# Patient Record
Sex: FEMALE
[De-identification: names, ages, dates, MRNs, and addresses within clinical notes are randomized per-mention and may not be internally consistent; named-entity substitution may affect disease eponyms.]

---

## 2019-02-28 PROBLEM — Z00.00 ENCOUNTER FOR PREVENTIVE HEALTH EXAMINATION: Status: ACTIVE | Noted: 2019-02-28

## 2019-03-01 ENCOUNTER — APPOINTMENT (OUTPATIENT)
Dept: CARDIOLOGY | Facility: CLINIC | Age: 34
End: 2019-03-01
Payer: COMMERCIAL

## 2019-03-01 VITALS
WEIGHT: 193.38 LBS | SYSTOLIC BLOOD PRESSURE: 94 MMHG | OXYGEN SATURATION: 98 % | HEART RATE: 80 BPM | DIASTOLIC BLOOD PRESSURE: 68 MMHG | HEIGHT: 60 IN | BODY MASS INDEX: 37.97 KG/M2

## 2019-03-01 DIAGNOSIS — R07.89 OTHER CHEST PAIN: ICD-10-CM

## 2019-03-01 DIAGNOSIS — Z82.49 FAMILY HISTORY OF ISCHEMIC HEART DISEASE AND OTHER DISEASES OF THE CIRCULATORY SYSTEM: ICD-10-CM

## 2019-03-01 DIAGNOSIS — R94.31 ABNORMAL ELECTROCARDIOGRAM [ECG] [EKG]: ICD-10-CM

## 2019-03-01 DIAGNOSIS — Z83.3 FAMILY HISTORY OF DIABETES MELLITUS: ICD-10-CM

## 2019-03-01 PROCEDURE — 99204 OFFICE O/P NEW MOD 45 MIN: CPT

## 2019-03-01 PROCEDURE — 93306 TTE W/DOPPLER COMPLETE: CPT

## 2019-03-01 NOTE — REVIEW OF SYSTEMS
[Chest Pain] : chest pain [Dizziness] : dizziness [Numbness (Hypesthesia)] : numbness [Negative] : Endocrine [Shortness Of Breath] : no shortness of breath [Lower Ext Edema] : no extremity edema [Palpitations] : no palpitations [Tremor] : no tremor was seen [Convulsions] : no convulsions [Tingling (Paresthesia)] : no tingling [Easy Bleeding] : no tendency for easy bleeding [Easy Bruising] : no tendency for easy bruising

## 2019-03-01 NOTE — DISCUSSION/SUMMARY
[FreeTextEntry1] : In a summary Everardo Chavez is a young female with chest pains though atypical they are recurrent and abnormal EKG, Echo done showed normal LV systolic function and wall motion. Will get stress test to evaluate chest pains. BP on lower side. Drink plenty of water. Further plan based on stress test results.

## 2019-03-01 NOTE — PHYSICAL EXAM
[General Appearance - Well Developed] : well developed [Normal Appearance] : normal appearance [Well Groomed] : well groomed [General Appearance - Well Nourished] : well nourished [No Deformities] : no deformities [General Appearance - In No Acute Distress] : no acute distress [Normal Conjunctiva] : the conjunctiva exhibited no abnormalities [Eyelids - No Xanthelasma] : the eyelids demonstrated no xanthelasmas [Normal Oral Mucosa] : normal oral mucosa [No Oral Pallor] : no oral pallor [No Oral Cyanosis] : no oral cyanosis [Heart Rate And Rhythm] : heart rate and rhythm were normal [Heart Sounds] : normal S1 and S2 [Murmurs] : no murmurs present [Arterial Pulses Normal] : the arterial pulses were normal [Edema] : no peripheral edema present [Respiration, Rhythm And Depth] : normal respiratory rhythm and effort [Auscultation Breath Sounds / Voice Sounds] : lungs were clear to auscultation bilaterally [Bowel Sounds] : normal bowel sounds [Abdomen Soft] : soft [Abdomen Tenderness] : non-tender [Abnormal Walk] : normal gait [Nail Clubbing] : no clubbing of the fingernails [Cyanosis, Localized] : no localized cyanosis [Skin Color & Pigmentation] : normal skin color and pigmentation [Skin Turgor] : normal skin turgor [] : no rash [Oriented To Time, Place, And Person] : oriented to person, place, and time [Impaired Insight] : insight and judgment were intact [No Anxiety] : not feeling anxious [FreeTextEntry1] : No JVD

## 2019-03-01 NOTE — HISTORY OF PRESENT ILLNESS
[FreeTextEntry1] : Everardo Chavez is a 33 year old female with no significant medical history comes for cardiac evaluation. Complaining of random onset of left sided chest tightness, 3/10 in intensity along with left arm numbness lasts for few minutes and relieved by itself of couple of months duration. No exertional chest pains. Denies any shortness of breath on exertion or palpitations. ? Anxiety. Physically active.

## 2020-09-01 ENCOUNTER — RESULT REVIEW (OUTPATIENT)
Age: 35
End: 2020-09-01

## 2020-09-01 ENCOUNTER — INPATIENT (INPATIENT)
Facility: HOSPITAL | Age: 35
LOS: 0 days | Discharge: HOME | End: 2020-09-02
Attending: OBSTETRICS & GYNECOLOGY | Admitting: OBSTETRICS & GYNECOLOGY
Payer: COMMERCIAL

## 2020-09-01 VITALS — HEIGHT: 62 IN | WEIGHT: 209.44 LBS

## 2020-09-01 LAB
BASOPHILS # BLD AUTO: 0 K/UL — SIGNIFICANT CHANGE UP (ref 0–0.2)
BASOPHILS NFR BLD AUTO: 0 % — SIGNIFICANT CHANGE UP (ref 0–2)
BLD GP AB SCN SERPL QL: NEGATIVE — SIGNIFICANT CHANGE UP
EOSINOPHIL # BLD AUTO: 0 K/UL — SIGNIFICANT CHANGE UP (ref 0–0.5)
EOSINOPHIL NFR BLD AUTO: 0 % — SIGNIFICANT CHANGE UP (ref 0–6)
HCT VFR BLD CALC: 34.1 % — LOW (ref 34.5–45)
HGB BLD-MCNC: 10.8 G/DL — LOW (ref 11.5–15.5)
LYMPHOCYTES # BLD AUTO: 17.5 % — SIGNIFICANT CHANGE UP (ref 13–44)
LYMPHOCYTES # BLD AUTO: 3.48 K/UL — HIGH (ref 1–3.3)
MCHC RBC-ENTMCNC: 29 PG — SIGNIFICANT CHANGE UP (ref 27–34)
MCHC RBC-ENTMCNC: 31.7 GM/DL — LOW (ref 32–36)
MCV RBC AUTO: 91.4 FL — SIGNIFICANT CHANGE UP (ref 80–100)
MONOCYTES # BLD AUTO: 1.75 K/UL — HIGH (ref 0–0.9)
MONOCYTES NFR BLD AUTO: 8.8 % — SIGNIFICANT CHANGE UP (ref 2–14)
NEUTROPHILS # BLD AUTO: 14.65 K/UL — HIGH (ref 1.8–7.4)
NEUTROPHILS NFR BLD AUTO: 73.7 % — SIGNIFICANT CHANGE UP (ref 43–77)
PLATELET # BLD AUTO: 241 K/UL — SIGNIFICANT CHANGE UP (ref 150–400)
RBC # BLD: 3.73 M/UL — LOW (ref 3.8–5.2)
RBC # FLD: 12.6 % — SIGNIFICANT CHANGE UP (ref 10.3–14.5)
RH IG SCN BLD-IMP: POSITIVE — SIGNIFICANT CHANGE UP
SARS-COV-2 IGG SERPL QL IA: NEGATIVE — SIGNIFICANT CHANGE UP
SARS-COV-2 IGM SERPL IA-ACNC: <3.8 AU/ML — SIGNIFICANT CHANGE UP
SARS-COV-2 RNA SPEC QL NAA+PROBE: SIGNIFICANT CHANGE UP
T PALLIDUM AB TITR SER: NEGATIVE — SIGNIFICANT CHANGE UP
WBC # BLD: 19.88 K/UL — HIGH (ref 3.8–10.5)
WBC # FLD AUTO: 19.88 K/UL — HIGH (ref 3.8–10.5)

## 2020-09-01 PROCEDURE — 88307 TISSUE EXAM BY PATHOLOGIST: CPT | Mod: 26

## 2020-09-01 RX ORDER — DIPHENHYDRAMINE HCL 50 MG
25 CAPSULE ORAL EVERY 6 HOURS
Refills: 0 | Status: DISCONTINUED | OUTPATIENT
Start: 2020-09-01 | End: 2020-09-02

## 2020-09-01 RX ORDER — ACETAMINOPHEN 500 MG
975 TABLET ORAL
Refills: 0 | Status: DISCONTINUED | OUTPATIENT
Start: 2020-09-01 | End: 2020-09-02

## 2020-09-01 RX ORDER — AER TRAVELER 0.5 G/1
1 SOLUTION RECTAL; TOPICAL EVERY 4 HOURS
Refills: 0 | Status: DISCONTINUED | OUTPATIENT
Start: 2020-09-01 | End: 2020-09-02

## 2020-09-01 RX ORDER — SODIUM CHLORIDE 9 MG/ML
3 INJECTION INTRAMUSCULAR; INTRAVENOUS; SUBCUTANEOUS EVERY 8 HOURS
Refills: 0 | Status: DISCONTINUED | OUTPATIENT
Start: 2020-09-01 | End: 2020-09-02

## 2020-09-01 RX ORDER — OXYTOCIN 10 UNIT/ML
333.33 VIAL (ML) INJECTION
Qty: 20 | Refills: 0 | Status: DISCONTINUED | OUTPATIENT
Start: 2020-09-01 | End: 2020-09-02

## 2020-09-01 RX ORDER — LANOLIN
1 OINTMENT (GRAM) TOPICAL EVERY 6 HOURS
Refills: 0 | Status: DISCONTINUED | OUTPATIENT
Start: 2020-09-01 | End: 2020-09-02

## 2020-09-01 RX ORDER — MAGNESIUM HYDROXIDE 400 MG/1
30 TABLET, CHEWABLE ORAL
Refills: 0 | Status: DISCONTINUED | OUTPATIENT
Start: 2020-09-01 | End: 2020-09-02

## 2020-09-01 RX ORDER — OXYCODONE HYDROCHLORIDE 5 MG/1
5 TABLET ORAL ONCE
Refills: 0 | Status: DISCONTINUED | OUTPATIENT
Start: 2020-09-01 | End: 2020-09-02

## 2020-09-01 RX ORDER — FENTANYL/BUPIVACAINE/NS/PF 2MCG/ML-.1
250 PLASTIC BAG, INJECTION (ML) INJECTION
Refills: 0 | Status: DISCONTINUED | OUTPATIENT
Start: 2020-09-01 | End: 2020-09-02

## 2020-09-01 RX ORDER — CITRIC ACID/SODIUM CITRATE 300-500 MG
15 SOLUTION, ORAL ORAL EVERY 6 HOURS
Refills: 0 | Status: DISCONTINUED | OUTPATIENT
Start: 2020-09-01 | End: 2020-09-01

## 2020-09-01 RX ORDER — KETOROLAC TROMETHAMINE 30 MG/ML
30 SYRINGE (ML) INJECTION ONCE
Refills: 0 | Status: DISCONTINUED | OUTPATIENT
Start: 2020-09-01 | End: 2020-09-01

## 2020-09-01 RX ORDER — IBUPROFEN 200 MG
600 TABLET ORAL EVERY 6 HOURS
Refills: 0 | Status: DISCONTINUED | OUTPATIENT
Start: 2020-09-01 | End: 2020-09-02

## 2020-09-01 RX ORDER — TETANUS TOXOID, REDUCED DIPHTHERIA TOXOID AND ACELLULAR PERTUSSIS VACCINE, ADSORBED 5; 2.5; 8; 8; 2.5 [IU]/.5ML; [IU]/.5ML; UG/.5ML; UG/.5ML; UG/.5ML
0.5 SUSPENSION INTRAMUSCULAR ONCE
Refills: 0 | Status: COMPLETED | OUTPATIENT
Start: 2020-09-01

## 2020-09-01 RX ORDER — BENZOCAINE 10 %
1 GEL (GRAM) MUCOUS MEMBRANE EVERY 6 HOURS
Refills: 0 | Status: DISCONTINUED | OUTPATIENT
Start: 2020-09-01 | End: 2020-09-02

## 2020-09-01 RX ORDER — PRAMOXINE HYDROCHLORIDE 150 MG/15G
1 AEROSOL, FOAM RECTAL EVERY 4 HOURS
Refills: 0 | Status: DISCONTINUED | OUTPATIENT
Start: 2020-09-01 | End: 2020-09-02

## 2020-09-01 RX ORDER — HYDROCORTISONE 1 %
1 OINTMENT (GRAM) TOPICAL EVERY 6 HOURS
Refills: 0 | Status: DISCONTINUED | OUTPATIENT
Start: 2020-09-01 | End: 2020-09-02

## 2020-09-01 RX ORDER — DIBUCAINE 1 %
1 OINTMENT (GRAM) RECTAL EVERY 6 HOURS
Refills: 0 | Status: DISCONTINUED | OUTPATIENT
Start: 2020-09-01 | End: 2020-09-02

## 2020-09-01 RX ORDER — SIMETHICONE 80 MG/1
80 TABLET, CHEWABLE ORAL EVERY 4 HOURS
Refills: 0 | Status: DISCONTINUED | OUTPATIENT
Start: 2020-09-01 | End: 2020-09-02

## 2020-09-01 RX ORDER — IBUPROFEN 200 MG
600 TABLET ORAL EVERY 6 HOURS
Refills: 0 | Status: COMPLETED | OUTPATIENT
Start: 2020-09-01 | End: 2021-07-31

## 2020-09-01 RX ORDER — OXYCODONE HYDROCHLORIDE 5 MG/1
5 TABLET ORAL
Refills: 0 | Status: DISCONTINUED | OUTPATIENT
Start: 2020-09-01 | End: 2020-09-02

## 2020-09-01 RX ORDER — SODIUM CHLORIDE 9 MG/ML
1000 INJECTION, SOLUTION INTRAVENOUS
Refills: 0 | Status: DISCONTINUED | OUTPATIENT
Start: 2020-09-01 | End: 2020-09-01

## 2020-09-01 RX ADMIN — SODIUM CHLORIDE 125 MILLILITER(S): 9 INJECTION, SOLUTION INTRAVENOUS at 02:05

## 2020-09-01 RX ADMIN — Medication 30 MILLIGRAM(S): at 06:05

## 2020-09-01 RX ADMIN — Medication 1 TABLET(S): at 13:05

## 2020-09-01 RX ADMIN — Medication 1000 MILLIUNIT(S)/MIN: at 04:48

## 2020-09-01 RX ADMIN — Medication 1000 MILLIUNIT(S)/MIN: at 06:52

## 2020-09-01 RX ADMIN — Medication 600 MILLIGRAM(S): at 18:20

## 2020-09-01 RX ADMIN — Medication 975 MILLIGRAM(S): at 11:30

## 2020-09-01 RX ADMIN — Medication 975 MILLIGRAM(S): at 10:57

## 2020-09-01 RX ADMIN — Medication 975 MILLIGRAM(S): at 21:23

## 2020-09-01 RX ADMIN — Medication 600 MILLIGRAM(S): at 19:00

## 2020-09-01 RX ADMIN — Medication 975 MILLIGRAM(S): at 22:21

## 2020-09-01 RX ADMIN — SODIUM CHLORIDE 125 MILLILITER(S): 9 INJECTION, SOLUTION INTRAVENOUS at 02:59

## 2020-09-02 ENCOUNTER — TRANSCRIPTION ENCOUNTER (OUTPATIENT)
Age: 35
End: 2020-09-02

## 2020-09-02 VITALS
HEART RATE: 68 BPM | DIASTOLIC BLOOD PRESSURE: 80 MMHG | SYSTOLIC BLOOD PRESSURE: 121 MMHG | TEMPERATURE: 98 F | RESPIRATION RATE: 17 BRPM | OXYGEN SATURATION: 98 %

## 2020-09-02 PROCEDURE — 88307 TISSUE EXAM BY PATHOLOGIST: CPT

## 2020-09-02 PROCEDURE — 85025 COMPLETE CBC W/AUTO DIFF WBC: CPT

## 2020-09-02 PROCEDURE — 86901 BLOOD TYPING SEROLOGIC RH(D): CPT

## 2020-09-02 PROCEDURE — 36415 COLL VENOUS BLD VENIPUNCTURE: CPT

## 2020-09-02 PROCEDURE — 86850 RBC ANTIBODY SCREEN: CPT

## 2020-09-02 PROCEDURE — 90715 TDAP VACCINE 7 YRS/> IM: CPT

## 2020-09-02 PROCEDURE — 86780 TREPONEMA PALLIDUM: CPT

## 2020-09-02 PROCEDURE — 87635 SARS-COV-2 COVID-19 AMP PRB: CPT

## 2020-09-02 PROCEDURE — 86769 SARS-COV-2 COVID-19 ANTIBODY: CPT

## 2020-09-02 RX ORDER — TETANUS TOXOID, REDUCED DIPHTHERIA TOXOID AND ACELLULAR PERTUSSIS VACCINE, ADSORBED 5; 2.5; 8; 8; 2.5 [IU]/.5ML; [IU]/.5ML; UG/.5ML; UG/.5ML; UG/.5ML
0.5 SUSPENSION INTRAMUSCULAR ONCE
Refills: 0 | Status: COMPLETED | OUTPATIENT
Start: 2020-09-02 | End: 2020-09-02

## 2020-09-02 RX ORDER — IBUPROFEN 200 MG
1 TABLET ORAL
Qty: 0 | Refills: 0 | DISCHARGE
Start: 2020-09-02

## 2020-09-02 RX ORDER — ACETAMINOPHEN 500 MG
3 TABLET ORAL
Qty: 0 | Refills: 0 | DISCHARGE
Start: 2020-09-02

## 2020-09-02 RX ADMIN — Medication 600 MILLIGRAM(S): at 05:55

## 2020-09-02 RX ADMIN — Medication 600 MILLIGRAM(S): at 14:00

## 2020-09-02 RX ADMIN — SODIUM CHLORIDE 3 MILLILITER(S): 9 INJECTION INTRAMUSCULAR; INTRAVENOUS; SUBCUTANEOUS at 00:17

## 2020-09-02 RX ADMIN — Medication 975 MILLIGRAM(S): at 16:07

## 2020-09-02 RX ADMIN — Medication 600 MILLIGRAM(S): at 00:18

## 2020-09-02 RX ADMIN — SODIUM CHLORIDE 3 MILLILITER(S): 9 INJECTION INTRAMUSCULAR; INTRAVENOUS; SUBCUTANEOUS at 06:19

## 2020-09-02 RX ADMIN — Medication 600 MILLIGRAM(S): at 06:55

## 2020-09-02 RX ADMIN — Medication 600 MILLIGRAM(S): at 13:26

## 2020-09-02 RX ADMIN — Medication 1 TABLET(S): at 13:26

## 2020-09-02 RX ADMIN — Medication 975 MILLIGRAM(S): at 09:50

## 2020-09-02 RX ADMIN — Medication 600 MILLIGRAM(S): at 01:10

## 2020-09-02 RX ADMIN — Medication 975 MILLIGRAM(S): at 09:17

## 2020-09-02 RX ADMIN — TETANUS TOXOID, REDUCED DIPHTHERIA TOXOID AND ACELLULAR PERTUSSIS VACCINE, ADSORBED 0.5 MILLILITER(S): 5; 2.5; 8; 8; 2.5 SUSPENSION INTRAMUSCULAR at 05:55

## 2020-09-02 NOTE — DISCHARGE NOTE OB - SEVERE ABDOMINAL/VAGINAL AND/OR RECTAL PAIN
Patient is called recommendations are given diet is gone over as well as notifying patient they are sent in the mail he will make appt to see NP and continue to complete the mediation therapy   Statement Selected

## 2020-09-02 NOTE — DISCHARGE NOTE OB - CARE PROVIDER_API CALL
Maday Lopes  OBSTETRICS AND GYNECOLOGY  [address] [place] [address]  Phone: [phone]  Fax: [phone]  Follow Up Time:

## 2020-09-02 NOTE — PROGRESS NOTE ADULT - SUBJECTIVE AND OBJECTIVE BOX
Patient is a 34y y.o. F now PPD #1 s/p .    NAEO. Patient was evaluated at bedside this AM. Pain is well-controlled with PO pain medications. She has been ambulating without difficulty and voiding spontaneously. She endorses decreasing vaginal bleeding.    Vital Signs Last 24 Hrs  T(C): 36.6 (02 Sep 2020 02:00), Max: 36.8 (01 Sep 2020 18:05)  T(F): 97.8 (02 Sep 2020 02:00), Max: 98.2 (01 Sep 2020 18:05)  HR: 68 (02 Sep 2020 02:00) (61 - 83)  BP: 121/80 (02 Sep 2020 02:00) (106/74 - 122/78)  BP(mean): --  RR: 17 (02 Sep 2020 02:00) (16 - 17)  SpO2: 98% (02 Sep 2020 02:00) (98% - 100%)    Physical Exam  Gen: Well-appearing. No acute distress. Resting comfortably in bed.  Resp: Breathing comfortably on RA.  Abd: Soft, non-tender, non-distended. Uterus firm at umbilicus.  Extremities: No calf tenderness.    Labs                          10.8   19.88 )-----------( 241      ( 01 Sep 2020 01:45 )             34.1                   20 @ 07:  -  20 @ 07:00  --------------------------------------------------------  IN: 3600 mL / OUT: 525 mL / NET: 3075 mL    20 @ 07:  -  20 @ 06:00  --------------------------------------------------------  IN: 0 mL / OUT: 900 mL / NET: -900 mL

## 2020-09-02 NOTE — DISCHARGE NOTE OB - CARE PLAN
Principal Discharge DX:	Vaginal delivery  Goal:	feel well  Assessment and plan of treatment:	Vaginal delivery, meeting all postpartum milestones.  Follow up in 6 weeks.

## 2020-09-02 NOTE — DISCHARGE NOTE OB - MATERIALS PROVIDED
Discharge Medication Information for Patients and Families Pocket Guide/Breastfeeding Mother’s Support Group Information/Tdap Vaccination (VIS Pub Date: 2012)/  Immunization Record/Breastfeeding Log/Back To Sleep Handout/Birth Certificate Instructions/[facility] Hearing Screen Program/Shaken Baby Prevention Handout/Vaccinations

## 2020-09-02 NOTE — DISCHARGE NOTE OB - PATIENT PORTAL LINK FT
You can access the FollowMyHealth Patient Portal offered by [facility] by registering at the following website: [email or web address]. By joining [facility]’s FollowMyHealth portal, you will also be able to view your health information using other applications (apps) compatible with our system.

## 2020-09-02 NOTE — LACTATION INITIAL EVALUATION - NS LACT CON REASON FOR REQ
multiparous mom multiparous mom/Pt. is a P3 at 38.4 wks. gestation via vaginal delivery.  Pt. denies medical problems during the pregnancy.  Pt. states she did have difficulty with breastfeeding her two older children.  Pt.rports milk supply was slow coming in  first child difficult time latching.   and supplemented for approx. 6 months.  Second child also difficulty latching and low milk supply although baby did not have significant weight loss.  Pt. had baby to breast  and a shallow latch was observed.  Baby removed from breast to reposition and attempt to relatch.  Pt.stated pediatrician mentioned a tongue tie and upon oral exam a suspected posterior tongue tie was observed.  Discussed with parents implications of a tongue tie with regards to breastfeeding.  Pt. encouraged to follow up with pediatrician and ask for referral for a pediatric ENT or dentist to evaluate frenulum.  Baby was positioned to breast.  Reviewed with pt. alignment of baby's nose topt.' nipple and to encourage baby to open mouth wide to achieve a deep latch.  [place] was able to achieve a deep latch and maintain consistent sucking for approx.  7-8 minutes.  Discussed with pt. due to hx of low milk supply pt. can pump after breastfeeding to provide increased stimulation.  pt.states nurse instructed pt. on use of pump and pt. states she does have pump at home.  At this time discussed with pt. although tongue tie was observed baby is able to maintain consistent latch and is satisfied after feeding, voiding and stooling appropiately  tongue tie is not interfering with breastfeeding.  Pt. has easily expressed colostrum and shown to pt. multiparous mom/Pt. is a P3 at 38.4 wks. gestation via vaginal delivery.  Pt. denies medical problems during the pregnancy.  Pt. states she did have difficulty with breastfeeding her two older children.  Pt. reports milk supply was slow coming in with first child had difficult time latching.   and supplemented for approx. 6 months.  Second child also had difficulty latching and low milk supply although baby did not have significant weight loss.  Pt. had baby to breast  and a shallow latch was observed.  Baby removed from breast to reposition and attempt to relatch.  Pt. stated pediatrician mentioned a tongue tie and upon oral exam a suspected posterior tongue tie was observed.  Discussed with parents implications of a tongue tie with regards to breastfeeding.  Pt. encouraged to follow up with pediatrician and ask for referral for a pediatric ENT or dentist to evaluate frenulum.  Baby was positioned to breast.  Reviewed with pt. alignment of baby's nose to pt.' nipple and to encourage baby to open mouth wide to achieve a deep latch.  Baby was able to achieve a deep latch and maintain consistent sucking for approx.  7-8 minutes.  Discussed with pt. due to hx of low milk supply pt. can pump after breastfeeding to provide increased stimulation.  Pt. states nurse instructed pt. on use of pump and pt. states she does have pump at home.  At this time discussed with pt. although tongue tie was observed baby is able to maintain consistent latch and is satisfied after feeding, voiding and stooling appropiately  tongue tie does not appear to be interfering with breastfeeding.  Pt. understands to continue monitoring breastfeeding.  Pt. has easily expressed colostrum and shown to pt., reviewed hand expression with pt.

## 2020-09-02 NOTE — LACTATION INITIAL EVALUATION - LACTATION INTERVENTIONS
initiate dual electric pump routine/initiate skin to skin/due to previous hx with low supply and slow transition pt. encouraged to pump after breastfeeding and collect any colsotrum and feed to baby after breastfeeding during next feeding session.

## 2020-09-02 NOTE — PROGRESS NOTE ADULT - ASSESSMENT
Assessment/Plan    34y y.o. now PPD#1  s/p . AfVSS. Patient is progressing toward all postpartum milestones. Pain is well-controlled on PO medications. Anticipate discharge today or tomorrow.    1. Pain  - Well-controlled on Motrin and Tylenol ATC    2. GI  - Tolerating regular diet without n/v  - Senna PRN    3.   - Voiding spontaneously without difficulty/pain    4. DVT prophylaxis  - Encouraging ambulation    5. Dispo  - Anticipate dispo PPD#1        Silvia Jenkins MD PGY1

## 2020-09-02 NOTE — LACTATION INITIAL EVALUATION - NS LACT CON PARTIAL SUC
low supply/pt. states had difficulty with first two children,  took long time for milk to come in so pt. supplemented.  First child pt. / pumped x 6 months, Second child  and supplemented for 1 yr

## 2020-09-05 DIAGNOSIS — Z3A.38 38 WEEKS GESTATION OF PREGNANCY: ICD-10-CM

## 2020-09-05 DIAGNOSIS — Z88.2 ALLERGY STATUS TO SULFONAMIDES: ICD-10-CM

## 2020-09-09 LAB — SURGICAL PATHOLOGY STUDY: SIGNIFICANT CHANGE UP
